# Patient Record
Sex: MALE | Race: WHITE | Employment: FULL TIME | URBAN - NONMETROPOLITAN AREA
[De-identification: names, ages, dates, MRNs, and addresses within clinical notes are randomized per-mention and may not be internally consistent; named-entity substitution may affect disease eponyms.]

---

## 2017-12-31 ENCOUNTER — HOSPITAL ENCOUNTER (EMERGENCY)
Age: 42
Discharge: HOME | End: 2017-12-31
Attending: FAMILY MEDICINE
Payer: COMMERCIAL

## 2017-12-31 VITALS
SYSTOLIC BLOOD PRESSURE: 118 MMHG | RESPIRATION RATE: 18 BRPM | TEMPERATURE: 98.8 F | DIASTOLIC BLOOD PRESSURE: 75 MMHG | HEART RATE: 87 BPM | OXYGEN SATURATION: 99 %

## 2017-12-31 DIAGNOSIS — J11.1 INFLUENZA WITH RESPIRATORY MANIFESTATION OTHER THAN PNEUMONIA: Primary | ICD-10-CM

## 2017-12-31 DIAGNOSIS — G62.9 NEUROPATHY: ICD-10-CM

## 2017-12-31 DIAGNOSIS — E87.1 HYPONATREMIA: ICD-10-CM

## 2017-12-31 LAB
ABSOLUTE EOS #: 0 K/UL (ref 0–0.4)
ABSOLUTE IMMATURE GRANULOCYTE: ABNORMAL K/UL (ref 0–0.3)
ABSOLUTE LYMPH #: 1.1 K/UL (ref 1–4.8)
ABSOLUTE MONO #: 0.4 K/UL (ref 0–1)
ANION GAP SERPL CALCULATED.3IONS-SCNC: 11 MMOL/L (ref 9–17)
BASOPHILS # BLD: 0 % (ref 0–2)
BASOPHILS ABSOLUTE: 0 K/UL (ref 0–0.2)
BUN BLDV-MCNC: 15 MG/DL (ref 6–20)
BUN/CREAT BLD: 18 (ref 9–20)
CALCIUM SERPL-MCNC: 9.3 MG/DL (ref 8.6–10.4)
CHLORIDE BLD-SCNC: 91 MMOL/L (ref 98–107)
CO2: 29 MMOL/L (ref 20–31)
CREAT SERPL-MCNC: 0.83 MG/DL (ref 0.7–1.2)
DIFFERENTIAL TYPE: YES
DIRECT EXAM: ABNORMAL
DIRECT EXAM: NORMAL
EOSINOPHILS RELATIVE PERCENT: 0 % (ref 0–5)
GFR AFRICAN AMERICAN: >60 ML/MIN
GFR NON-AFRICAN AMERICAN: >60 ML/MIN
GFR SERPL CREATININE-BSD FRML MDRD: ABNORMAL ML/MIN/{1.73_M2}
GFR SERPL CREATININE-BSD FRML MDRD: ABNORMAL ML/MIN/{1.73_M2}
GLUCOSE BLD-MCNC: 106 MG/DL (ref 70–99)
HCT VFR BLD CALC: 39 % (ref 41–53)
HEMOGLOBIN: 13.2 G/DL (ref 13.5–17.5)
IMMATURE GRANULOCYTES: ABNORMAL %
LYMPHOCYTES # BLD: 15 % (ref 13–44)
Lab: ABNORMAL
Lab: NORMAL
MCH RBC QN AUTO: 28.8 PG (ref 26–34)
MCHC RBC AUTO-ENTMCNC: 33.8 G/DL (ref 31–37)
MCV RBC AUTO: 85.4 FL (ref 80–100)
MONOCYTES # BLD: 5 % (ref 5–9)
PDW BLD-RTO: 13.5 % (ref 12.1–15.2)
PLATELET # BLD: 175 K/UL (ref 140–450)
PLATELET ESTIMATE: ABNORMAL
PMV BLD AUTO: ABNORMAL FL (ref 6–12)
POTASSIUM SERPL-SCNC: 3.8 MMOL/L (ref 3.7–5.3)
RBC # BLD: 4.56 M/UL (ref 4.5–5.9)
RBC # BLD: ABNORMAL 10*6/UL
SEG NEUTROPHILS: 80 % (ref 39–75)
SEGMENTED NEUTROPHILS ABSOLUTE COUNT: 5.9 K/UL (ref 2.1–6.5)
SODIUM BLD-SCNC: 131 MMOL/L (ref 135–144)
SPECIMEN DESCRIPTION: ABNORMAL
SPECIMEN DESCRIPTION: NORMAL
STATUS: ABNORMAL
STATUS: NORMAL
VITAMIN B-12: 358 PG/ML (ref 211–946)
WBC # BLD: 7.5 K/UL (ref 3.5–11)
WBC # BLD: ABNORMAL 10*3/UL

## 2017-12-31 PROCEDURE — 86063 ANTISTREPTOLYSIN O SCREEN: CPT

## 2017-12-31 PROCEDURE — 2580000003 HC RX 258: Performed by: FAMILY MEDICINE

## 2017-12-31 PROCEDURE — 36415 COLL VENOUS BLD VENIPUNCTURE: CPT

## 2017-12-31 PROCEDURE — 87804 INFLUENZA ASSAY W/OPTIC: CPT

## 2017-12-31 PROCEDURE — 6360000002 HC RX W HCPCS: Performed by: FAMILY MEDICINE

## 2017-12-31 PROCEDURE — 80048 BASIC METABOLIC PNL TOTAL CA: CPT

## 2017-12-31 PROCEDURE — 99283 EMERGENCY DEPT VISIT LOW MDM: CPT

## 2017-12-31 PROCEDURE — 82607 VITAMIN B-12: CPT

## 2017-12-31 PROCEDURE — 85025 COMPLETE CBC W/AUTO DIFF WBC: CPT

## 2017-12-31 PROCEDURE — 87651 STREP A DNA AMP PROBE: CPT

## 2017-12-31 PROCEDURE — 6370000000 HC RX 637 (ALT 250 FOR IP): Performed by: FAMILY MEDICINE

## 2017-12-31 RX ORDER — ONDANSETRON 4 MG/1
4 TABLET, ORALLY DISINTEGRATING ORAL ONCE
Status: COMPLETED | OUTPATIENT
Start: 2017-12-31 | End: 2017-12-31

## 2017-12-31 RX ORDER — LANOLIN ALCOHOL/MO/W.PET/CERES
1000 CREAM (GRAM) TOPICAL DAILY
Qty: 30 TABLET | Refills: 3 | Status: SHIPPED | OUTPATIENT
Start: 2017-12-31 | End: 2020-09-05 | Stop reason: ALTCHOICE

## 2017-12-31 RX ORDER — SODIUM CHLORIDE 9 MG/ML
INJECTION, SOLUTION INTRAVENOUS CONTINUOUS
Status: DISCONTINUED | OUTPATIENT
Start: 2017-12-31 | End: 2017-12-31 | Stop reason: HOSPADM

## 2017-12-31 RX ORDER — GABAPENTIN 100 MG/1
300 CAPSULE ORAL 2 TIMES DAILY
COMMUNITY
End: 2020-09-05 | Stop reason: ALTCHOICE

## 2017-12-31 RX ORDER — ONDANSETRON 4 MG/1
4 TABLET, ORALLY DISINTEGRATING ORAL EVERY 4 HOURS PRN
Qty: 40 TABLET | Refills: 0 | Status: SHIPPED | OUTPATIENT
Start: 2017-12-31 | End: 2018-01-10

## 2017-12-31 RX ORDER — OSELTAMIVIR PHOSPHATE 75 MG/1
75 CAPSULE ORAL 2 TIMES DAILY
Qty: 20 CAPSULE | Refills: 0 | Status: SHIPPED | OUTPATIENT
Start: 2017-12-31 | End: 2018-01-10

## 2017-12-31 RX ORDER — OSELTAMIVIR PHOSPHATE 75 MG/1
75 CAPSULE ORAL ONCE
Status: COMPLETED | OUTPATIENT
Start: 2017-12-31 | End: 2017-12-31

## 2017-12-31 RX ORDER — BUPROPION HYDROCHLORIDE 150 MG/1
150 TABLET ORAL DAILY
COMMUNITY
End: 2020-09-05 | Stop reason: ALTCHOICE

## 2017-12-31 RX ADMIN — ONDANSETRON 4 MG: 4 TABLET, ORALLY DISINTEGRATING ORAL at 14:28

## 2017-12-31 RX ADMIN — OSELTAMIVIR PHOSPHATE 75 MG: 75 CAPSULE ORAL at 14:28

## 2017-12-31 RX ADMIN — SODIUM CHLORIDE: 9 INJECTION, SOLUTION INTRAVENOUS at 15:29

## 2017-12-31 ASSESSMENT — PAIN SCALES - GENERAL
PAINLEVEL_OUTOF10: 6
PAINLEVEL_OUTOF10: 7

## 2017-12-31 ASSESSMENT — PAIN DESCRIPTION - PAIN TYPE: TYPE: ACUTE PAIN

## 2017-12-31 ASSESSMENT — PAIN DESCRIPTION - FREQUENCY: FREQUENCY: INTERMITTENT

## 2017-12-31 ASSESSMENT — PAIN DESCRIPTION - DESCRIPTORS: DESCRIPTORS: ACHING

## 2017-12-31 ASSESSMENT — PAIN DESCRIPTION - LOCATION: LOCATION: OTHER (COMMENT)

## 2017-12-31 NOTE — ED PROVIDER NOTES
eMERGENCY dEPARTMENT eNCOUnter        [facility]    Chief Complaint   Patient presents with    Fever    Nausea    Pharyngitis    Cough    Generalized Body Aches       HPI    Bianca Kelly is a 40 y.o. male who presents to ED from Home by private vehicle with spouse he is complaining of being sick for about a week and worse for the last 2 days. He is not eating very much but he is drinking lots of Gatorade and his last urination this morning was not extremely concentrated. His pain level is a 7 aching all over. REVIEW OF SYSTEMS    All systems reviewed and positives are in the HPI      PAST MEDICAL HISTORY    Past Medical History:   Diagnosis Date    Depression     Neuropathy ([facility] [address]        SURGICAL HISTORY    Past Surgical History:   Procedure Laterality Date    VASECTOMY REVERSAL         CURRENT MEDICATIONS    Current Outpatient Rx   Medication Sig Dispense Refill    buPROPion (WELLBUTRIN XL) 150 MG extended release tablet Take 150 mg by mouth daily      gabapentin (NEURONTIN) 100 MG capsule Take 300 mg by mouth 2 times daily.       ondansetron (ZOFRAN ODT) 4 MG disintegrating tablet Take 1 tablet by mouth every 4 hours as needed for Nausea or Vomiting 40 tablet 0    vitamin B-12 (CYANOCOBALAMIN) 1000 MCG tablet Take 1 tablet by mouth daily 30 tablet 3    oseltamivir (TAMIFLU) 75 MG capsule Take 1 capsule by mouth 2 times daily for 10 days 20 capsule 0    aspirin 81 MG tablet Take 81 mg by mouth daily         ALLERGIES    No Known Allergies    FAMILY HISTORY    Family History   Problem Relation Age of Onset    Diabetes Mother     Cancer Father        SOCIAL HISTORY    Social History     Social History    Marital status:      Spouse name: N/A    Number of children: N/A    Years of education: N/A     Social History Main Topics    Smoking status: Never Smoker    Smokeless tobacco: Never Used    Alcohol use No    Drug use: No    Sexual activity: Not Asked     Other Topics Concern

## 2018-01-01 LAB
ANTISTREPTOLYSIN-O: 31 IU/ML (ref 0–200)
DIRECT EXAM: NORMAL
DIRECT EXAM: NORMAL
Lab: NORMAL
SPECIMEN DESCRIPTION: NORMAL
SPECIMEN DESCRIPTION: NORMAL
STATUS: NORMAL

## 2018-02-10 ENCOUNTER — APPOINTMENT (OUTPATIENT)
Dept: GENERAL RADIOLOGY | Age: 43
End: 2018-02-10
Payer: COMMERCIAL

## 2018-02-10 ENCOUNTER — HOSPITAL ENCOUNTER (EMERGENCY)
Age: 43
Discharge: HOME | End: 2018-02-10
Attending: FAMILY MEDICINE
Payer: COMMERCIAL

## 2018-02-10 VITALS
BODY MASS INDEX: 20.67 KG/M2 | RESPIRATION RATE: 16 BRPM | TEMPERATURE: 99.1 F | SYSTOLIC BLOOD PRESSURE: 110 MMHG | HEART RATE: 92 BPM | WEIGHT: 140 LBS | OXYGEN SATURATION: 100 % | DIASTOLIC BLOOD PRESSURE: 68 MMHG

## 2018-02-10 DIAGNOSIS — S63.501A SPRAIN OF RIGHT WRIST, INITIAL ENCOUNTER: Primary | ICD-10-CM

## 2018-02-10 PROCEDURE — 99283 EMERGENCY DEPT VISIT LOW MDM: CPT

## 2018-02-10 PROCEDURE — 73110 X-RAY EXAM OF WRIST: CPT

## 2018-02-10 RX ORDER — NAPROXEN SODIUM 220 MG
440 TABLET ORAL 2 TIMES DAILY WITH MEALS
COMMUNITY
End: 2020-09-05 | Stop reason: ALTCHOICE

## 2018-02-10 ASSESSMENT — PAIN DESCRIPTION - ORIENTATION: ORIENTATION: RIGHT

## 2018-02-10 ASSESSMENT — PAIN DESCRIPTION - LOCATION: LOCATION: WRIST

## 2018-02-10 ASSESSMENT — PAIN DESCRIPTION - DESCRIPTORS: DESCRIPTORS: ACHING

## 2018-02-10 ASSESSMENT — PAIN SCALES - GENERAL
PAINLEVEL_OUTOF10: 6
PAINLEVEL_OUTOF10: 0
PAINLEVEL_OUTOF10: 3

## 2018-02-10 ASSESSMENT — PAIN DESCRIPTION - PAIN TYPE: TYPE: ACUTE PAIN

## 2018-02-10 ASSESSMENT — PAIN DESCRIPTION - FREQUENCY: FREQUENCY: CONTINUOUS

## 2018-02-11 NOTE — ED PROVIDER NOTES
appearance   HENT:  Atraumatic, external ears normal, nose normal, oropharynx moist.  Neck- normal range of motion, no tenderness, supple   Respiratory:  No respiratory distress, normal breath sounds. Cardiovascular:  Normal rate, normal rhythm, no murmurs, no gallops, no rubs   GI:  Soft, nondistended, normal bowel sounds, nontender   Musculoskeletal:  Right wrist tenderness. Moderate swelling present. Decreased range of motion due to pain. Integument:  Well hydrated, no rash   Neurologic:  Grossly intact    RADIOLOGY/PROCEDURES    X-rays of right wrist were negative for fracture. ED COURSE & MEDICAL DECISION MAKING    Pertinent Labs & Imaging studies reviewed. (See chart for details)  Splint was applied to right wrist.  Patient was stable on discharge. FINAL IMPRESSION    1. Right wrist sprain. 2.      Summation      Patient Course: X-ray of right wrist was normal.  Splint was applied to right wrist.  Patient was stable on discharge. ED Medications administered this visit:  Medications - No data to display    New Prescriptions from this visit:    Discharge Medication List as of 2/10/2018  5:24 PM          Follow-up:  Madison Warren MD  [address] [place] [address] Dr. Greenberg [facility] 0  [phone]    Call in 2 days          Final Impression:   1.  Sprain of right wrist, initial encounter               (Please note that portions of this note were completed with a voice recognition program.  Efforts were made to edit the dictations but occasionally words are mis-transcribed.)       Pat Farley MD  02/11/18 0863

## 2020-09-05 ENCOUNTER — APPOINTMENT (OUTPATIENT)
Dept: CT IMAGING | Age: 45
End: 2020-09-05
Payer: COMMERCIAL

## 2020-09-05 ENCOUNTER — HOSPITAL ENCOUNTER (EMERGENCY)
Age: 45
Discharge: HOME | End: 2020-09-05
Attending: EMERGENCY MEDICINE
Payer: COMMERCIAL

## 2020-09-05 VITALS
DIASTOLIC BLOOD PRESSURE: 81 MMHG | HEIGHT: 69 IN | TEMPERATURE: 98.8 F | HEART RATE: 84 BPM | RESPIRATION RATE: 20 BRPM | OXYGEN SATURATION: 98 % | SYSTOLIC BLOOD PRESSURE: 130 MMHG | WEIGHT: 166 LBS | BODY MASS INDEX: 24.59 KG/M2

## 2020-09-05 LAB
ANION GAP SERPL CALCULATED.3IONS-SCNC: 11 MMOL/L (ref 9–17)
BUN BLDV-MCNC: 9 MG/DL (ref 6–20)
BUN/CREAT BLD: 9 (ref 9–20)
CALCIUM SERPL-MCNC: 9.7 MG/DL (ref 8.6–10.4)
CHLORIDE BLD-SCNC: 102 MMOL/L (ref 98–107)
CO2: 26 MMOL/L (ref 20–31)
CREAT SERPL-MCNC: 1.05 MG/DL (ref 0.7–1.2)
GFR AFRICAN AMERICAN: >60 ML/MIN
GFR NON-AFRICAN AMERICAN: >60 ML/MIN
GFR SERPL CREATININE-BSD FRML MDRD: ABNORMAL ML/MIN/{1.73_M2}
GFR SERPL CREATININE-BSD FRML MDRD: ABNORMAL ML/MIN/{1.73_M2}
GLUCOSE BLD-MCNC: 107 MG/DL (ref 70–99)
GLUCOSE BLD-MCNC: 108 MG/DL (ref 65–99)
HCT VFR BLD CALC: 42 % (ref 41–53)
HEMOGLOBIN: 14.1 G/DL (ref 13.5–17.5)
MCH RBC QN AUTO: 28.5 PG (ref 26–34)
MCHC RBC AUTO-ENTMCNC: 33.5 G/DL (ref 31–37)
MCV RBC AUTO: 85 FL (ref 80–100)
NRBC AUTOMATED: NORMAL PER 100 WBC
PDW BLD-RTO: 13.4 % (ref 12.1–15.2)
PLATELET # BLD: 331 K/UL (ref 140–450)
PMV BLD AUTO: NORMAL FL (ref 6–12)
POTASSIUM SERPL-SCNC: 3.6 MMOL/L (ref 3.7–5.3)
RBC # BLD: 4.94 M/UL (ref 4.5–5.9)
SODIUM BLD-SCNC: 139 MMOL/L (ref 135–144)
TROPONIN INTERP: NORMAL
TROPONIN T: <0.03 NG/ML
TROPONIN, HIGH SENSITIVITY: NORMAL NG/L (ref 0–22)
WBC # BLD: 6 K/UL (ref 3.5–11)

## 2020-09-05 PROCEDURE — 93005 ELECTROCARDIOGRAM TRACING: CPT | Performed by: EMERGENCY MEDICINE

## 2020-09-05 PROCEDURE — 85027 COMPLETE CBC AUTOMATED: CPT

## 2020-09-05 PROCEDURE — 82947 ASSAY GLUCOSE BLOOD QUANT: CPT

## 2020-09-05 PROCEDURE — 99284 EMERGENCY DEPT VISIT MOD MDM: CPT

## 2020-09-05 PROCEDURE — 36415 COLL VENOUS BLD VENIPUNCTURE: CPT

## 2020-09-05 PROCEDURE — 84484 ASSAY OF TROPONIN QUANT: CPT

## 2020-09-05 PROCEDURE — 6370000000 HC RX 637 (ALT 250 FOR IP): Performed by: EMERGENCY MEDICINE

## 2020-09-05 PROCEDURE — 70496 CT ANGIOGRAPHY HEAD: CPT

## 2020-09-05 PROCEDURE — 6360000004 HC RX CONTRAST MEDICATION: Performed by: EMERGENCY MEDICINE

## 2020-09-05 PROCEDURE — 70450 CT HEAD/BRAIN W/O DYE: CPT

## 2020-09-05 PROCEDURE — 80048 BASIC METABOLIC PNL TOTAL CA: CPT

## 2020-09-05 RX ORDER — CLOPIDOGREL BISULFATE 75 MG/1
75 TABLET ORAL DAILY
Qty: 30 TABLET | Refills: 0 | Status: SHIPPED | OUTPATIENT
Start: 2020-09-05

## 2020-09-05 RX ORDER — CLOPIDOGREL BISULFATE 75 MG/1
300 TABLET ORAL ONCE
Status: COMPLETED | OUTPATIENT
Start: 2020-09-05 | End: 2020-09-05

## 2020-09-05 RX ORDER — TETRACAINE HYDROCHLORIDE 5 MG/ML
1 SOLUTION OPHTHALMIC ONCE
Status: COMPLETED | OUTPATIENT
Start: 2020-09-05 | End: 2020-09-05

## 2020-09-05 RX ADMIN — IOPAMIDOL 100 ML: 755 INJECTION, SOLUTION INTRAVENOUS at 09:43

## 2020-09-05 RX ADMIN — TETRACAINE HYDROCHLORIDE 1 DROP: 5 SOLUTION OPHTHALMIC at 08:20

## 2020-09-05 RX ADMIN — CLOPIDOGREL BISULFATE 300 MG: 75 TABLET ORAL at 09:47

## 2020-09-05 RX ADMIN — FLUORESCEIN SODIUM 1 MG: 1 STRIP OPHTHALMIC at 08:21

## 2020-09-05 ASSESSMENT — ENCOUNTER SYMPTOMS
BACK PAIN: 0
TROUBLE SWALLOWING: 0
CHEST TIGHTNESS: 0
ABDOMINAL PAIN: 0
DIARRHEA: 0
SHORTNESS OF BREATH: 0
SORE THROAT: 0
EYE PAIN: 0
RHINORRHEA: 0
VOMITING: 0
WHEEZING: 0

## 2020-09-05 ASSESSMENT — VISUAL ACUITY
OS: 20/20
OD: 20/20
OU: 20/20

## 2020-09-05 ASSESSMENT — PAIN DESCRIPTION - DESCRIPTORS: DESCRIPTORS: STABBING;SHARP

## 2020-09-05 ASSESSMENT — PAIN SCALES - GENERAL: PAINLEVEL_OUTOF10: 0

## 2020-09-05 ASSESSMENT — PAIN DESCRIPTION - ORIENTATION: ORIENTATION: LEFT

## 2020-09-05 ASSESSMENT — PAIN DESCRIPTION - LOCATION: LOCATION: EYE

## 2020-09-05 NOTE — ED PROVIDER NOTES
@@  eMERGENCY dEPARTMENT eNCOUnter      Pt Name: Gunnar Vyas  MRN: [id number]  [place] 1975  Date of evaluation: 9/5/2020  Provider: Tj Carrillo MD    CHIEF COMPLAINT       Chief Complaint   Patient presents with    Eye Pain     Pt has had left eye pain for past couple days and woke up this morning with blurry vision in that eye. pt has hx retinal occulsion about 5-6 years ago. HISTORYOF PRESENT ILLNESS   (Location/Symptom, Timing/Onset, Context/Setting, Quality, Duration, ModifyingFactors, Severity) Note limiting factors. HPI    Gunnar Vyas is a 41 y.o. male who presents to the emergency department with acute loss of vision in the left eye. He states he was at work and at 7:15 AM, he lost most of his vision of his left eye, felt like there was just a big area of his vision that he could not see. It persisted on the drive over here, he states there would be whole cars that just disappeared when they went in that portion of his vision. Around 8:15 AM, as he was arriving to the ED, it resolved. He has a history of a central retinal artery occlusion of the left about 7 years ago. He was seen at [facility] at that time and started on baby aspirin. He took the baby aspirin for about 5 years but has not taken it over the last 2 years. When this occurred this morning, he did take 2 full aspirin prior to coming to the ED. He states his symptoms are now resolved. His past medical history includes the central retinal artery occlusion, also a \"virus\" this caused intermittent weakness of his right arm but is unrelated to the artery occlusion. He does not currently see a neurologist.    Nursing Notes were reviewed. REVIEW OF SYSTEMS    (2+ for level 4; 10+ for level 5)   Review of Systems   Constitutional: Negative for diaphoresis and fever. HENT: Negative for rhinorrhea, sore throat and trouble swallowing. Eyes: Positive for visual disturbance. Negative for pain. Respiratory: Negative for chest tightness, shortness of breath and wheezing. Cardiovascular: Negative for chest pain and leg swelling. Gastrointestinal: Negative for abdominal pain, diarrhea and vomiting. Endocrine: Negative for polyuria. Genitourinary: Negative for dysuria and frequency. Musculoskeletal: Negative for back pain, myalgias, neck pain and neck stiffness. Skin: Negative for rash. Allergic/Immunologic: Negative for immunocompromised state. Neurological: Negative for dizziness, seizures, syncope and headaches. Hematological: Does not bruise/bleed easily. Psychiatric/Behavioral: Negative for confusion. All other systems reviewed and are negative. PAST MEDICAL HISTORY     Past Medical History:   Diagnosis Date    Depression     Neuropathy        SURGICAL HISTORY       Past Surgical History:   Procedure Laterality Date    VASECTOMY REVERSAL         CURRENT MEDICATIONS     [unfilled]    ALLERGIES     Patient has no known allergies.     FAMILY HISTORY       Family History   Problem Relation Age of Onset    Diabetes Mother     Cancer Father         SOCIAL HISTORY       Social History     Socioeconomic History    Marital status:      Spouse name: None    Number of children: None    Years of education: None    Highest education level: None   Occupational History    None   Social Needs    Financial resource strain: None    Food insecurity     Worry: None     Inability: None    Transportation needs     Medical: None     Non-medical: None   Tobacco Use    Smoking status: Never Smoker    Smokeless tobacco: Never Used   Substance and Sexual Activity    Alcohol use: No    Drug use: No    Sexual activity: None   Lifestyle    Physical activity     Days per week: None     Minutes per session: None    Stress: None   Relationships    Social connections     Talks on phone: None     Gets together: None     Attends Yarsani service: None     Active member of club or organization: None     Attends meetings of clubs or organizations: None     Relationship status: None    Intimate partner violence     Fear of current or ex partner: None     Emotionally abused: None     Physically abused: None     Forced sexual activity: None   Other Topics Concern    None   Social History Narrative    None       SCREENINGS   NIH Stroke Scale  NIH Stroke Scale Assessed: Yes  Interval: Baseline  Level of Consciousness (1a. ): Alert  LOC Questions (1b. ): Answers both correctly  LOC Commands (1c. ): Performs both tasks correctly  Best Gaze (2. ): Normal  Visual (3. ): No visual loss  Facial Palsy (4. ): Normal symmetrical movement  Motor Arm, Left (5a. ): No drift  Motor Arm, Right (5b. ): No drift  Motor Leg, Left (6a. ): No drift  Motor Leg, Right (6b. ): No drift  Limb Ataxia (7. ): Absent  Sensory (8. ): Normal  Best Language (9. ): No aphasia  Dysarthria (10. ): Normal  Extinction and Inattention (11): No abnormality  Total: 0Glasgow Coma Scale  Eye Opening: Spontaneous  Best Verbal Response: Oriented  Best Motor Response: Obeys commands  [place] Coma Scale Score: 15      PHYSICAL EXAM    (up to 7 forlevel 4, 8 or more for level 5)     ED Triage Vitals [09/05/20 0809]   BP Temp Temp Source Pulse Resp SpO2 Height Weight   134/85 98.8 °F (37.1 °C) Oral 94 18 99 % 5' 9\" (1.753 m) 166 lb (75.3 kg)       Physical Exam  Vitals signs and nursing note reviewed. Constitutional:       General: He is not in acute distress. Appearance: He is well-developed. HENT:      Head: Normocephalic and atraumatic. Eyes:      General:         Right eye: No discharge. Left eye: No discharge. Extraocular Movements: Extraocular movements intact. Pupils: Pupils are equal, round, and reactive to light. Comments: No fluorescein uptake, no corneal abrasion. Visual fields intact. Visual acuity 20/20, OD, OS, OU. Neck:      Musculoskeletal: Normal range of motion and neck supple. aneurysm or dissection within the neck MRI would be more sensitive for detection of an acute infarct. ED BEDSIDE ULTRASOUND:   Performed by ED Physician - none    LABS:  Labs Reviewed   GLUCOSE, WHOLE BLOOD - Abnormal; Notable for the following components:       Result Value    POC Glucose 108 (*)     All other components within normal limits   BASIC METABOLIC PANEL - Abnormal; Notable for the following components:    Glucose 107 (*)     Potassium 3.6 (*)     All other components within normal limits   CBC   TROPONIN        All other labs were within normal range or not returned as of this dictation. EMERGENCY DEPARTMENT COURSE and DIFFERENTIALDIAGNOSIS/MDM:   Vitals:    Vitals:    09/05/20 0809 09/05/20 0843 09/05/20 0948   BP: 134/85 (!) 128/95 130/81   Pulse: 94 87 84   Resp: 18 20 20   Temp: 98.8 °F (37.1 °C)     TempSrc: Oral     SpO2: 99% 97% 98%   Weight: 166 lb (75.3 kg)     Height: 5' 9\" (1.753 m)         Medications   tetracaine (TETRAVISC) 0.5 % ophthalmic solution 1 drop (1 drop Left Eye Given 9/5/20 0820)   fluorescein ophthalmic strip 1 mg (1 mg Ophthalmic Given 9/5/20 0821)   clopidogrel (PLAVIX) tablet 300 mg (300 mg Oral Given 9/5/20 0947)   iopamidol (ISOVUE-370) 76 % injection 100 mL (100 mLs Intravenous Given 9/5/20 0943)       MDM. Discussed with Dr. Mi Wasserman, neurology at [facility]. Recommended plan is to check CTA head and neck. Provided that is negative we will load him with 300 mg of Plavix and then start him on 75 mg daily. Load him with 325 mg of aspirin which he already took prior to arrival and then continue him on 81 mg of aspirin daily. He will follow-up with Dr. Mi Wasserman in the office and return if any recurrence or change of symptoms. REVAL:         CRITICAL CARE TIME   Total Critical Care time was 0 minutes, excluding separatelyreportable procedures.   There was a high probability ofclinically significant/life threatening deterioration in the patient's condition which required my urgent intervention. CONSULTS:  [unfilled]    PROCEDURES:  Unless otherwise noted below, none     Procedures    FINAL IMPRESSION      1. Central retinal artery occlusion of left eye          DISPOSITION/PLAN   DISPOSITION        PATIENT REFERRED TO:  Dr Luis Carlos Rae  [phone]  Call for an appointment after the holiday weekend        Jamil Vasquez MD  [address] [place] [address], [address]  MOB # [address] [facility]  [phone]    In 2 days        DISCHARGE MEDICATIONS:  New Prescriptions    CLOPIDOGREL (PLAVIX) 75 MG TABLET    Take 1 tablet by mouth daily              Summation      Patient Course: Left central retinal artery occlusion, resolved, negative CT CTA. Neuro consulted, Started on aspirin and Plavix with close neuro follow-up. Plavix precautions given. Will return if any recurrence of symptoms. ED Medications administered this visit:    Medications   tetracaine (TETRAVISC) 0.5 % ophthalmic solution 1 drop (1 drop Left Eye Given 9/5/20 0820)   fluorescein ophthalmic strip 1 mg (1 mg Ophthalmic Given 9/5/20 0821)   clopidogrel (PLAVIX) tablet 300 mg (300 mg Oral Given 9/5/20 0947)   iopamidol (ISOVUE-370) 76 % injection 100 mL (100 mLs Intravenous Given 9/5/20 0943)       New Prescriptions from this visit:    New Prescriptions    CLOPIDOGREL (PLAVIX) 75 MG TABLET    Take 1 tablet by mouth daily       Follow-up:  Dr Luis Carlos Rae  [phone]  Call for an appointment after the holiday weekend        Jamil Vasquez MD  [address] [place] [address], [address]  MOB # [id number] [facility] [place] [place]  [phone]    In 2 days          Final Impression:  1. Central retinal artery occlusion of left eye                   (Please note:  Portions of this note were completed with a voicerecognition program.  Efforts were made to edit the dictations but occasionally words and phrases are mis-transcribed.)  Form v2016. J.5-cn    Marilyn Walker MD (electronically signed)  Emergency Medicine Provider            Marilyn Walker, MD  09/05/20 1056

## 2020-09-07 LAB
EKG ATRIAL RATE: 78 BPM
EKG P AXIS: 44 DEGREES
EKG P-R INTERVAL: 150 MS
EKG Q-T INTERVAL: 376 MS
EKG QRS DURATION: 82 MS
EKG QTC CALCULATION (BAZETT): 428 MS
EKG R AXIS: -7 DEGREES
EKG T AXIS: 35 DEGREES
EKG VENTRICULAR RATE: 78 BPM

## 2020-09-07 PROCEDURE — 93010 ELECTROCARDIOGRAM REPORT: CPT | Performed by: INTERNAL MEDICINE

## 2023-03-15 ENCOUNTER — APPOINTMENT (OUTPATIENT)
Dept: GENERAL RADIOLOGY | Age: 48
End: 2023-03-15
Payer: COMMERCIAL

## 2023-03-15 ENCOUNTER — HOSPITAL ENCOUNTER (EMERGENCY)
Age: 48
Discharge: HOME | End: 2023-03-15
Attending: EMERGENCY MEDICINE
Payer: COMMERCIAL

## 2023-03-15 VITALS
SYSTOLIC BLOOD PRESSURE: 99 MMHG | HEIGHT: 69 IN | BODY MASS INDEX: 24.91 KG/M2 | RESPIRATION RATE: 18 BRPM | DIASTOLIC BLOOD PRESSURE: 85 MMHG | TEMPERATURE: 98.2 F | HEART RATE: 103 BPM | WEIGHT: 168.2 LBS | OXYGEN SATURATION: 94 %

## 2023-03-15 DIAGNOSIS — B34.9 VIRAL SYNDROME: Primary | ICD-10-CM

## 2023-03-15 LAB
ABSOLUTE EOS #: 0.3 K/UL (ref 0–0.4)
ABSOLUTE LYMPH #: 1.8 K/UL (ref 1–4.8)
ABSOLUTE MONO #: 0.7 K/UL (ref 0–1)
ALBUMIN SERPL-MCNC: 4.2 G/DL (ref 3.5–5.2)
ALP SERPL-CCNC: 147 U/L (ref 40–129)
ALT SERPL-CCNC: 24 U/L (ref 5–41)
ANION GAP SERPL CALCULATED.3IONS-SCNC: 8 MMOL/L (ref 9–17)
AST SERPL-CCNC: 25 U/L
BASOPHILS # BLD: 1 % (ref 0–2)
BASOPHILS ABSOLUTE: 0 K/UL (ref 0–0.2)
BILIRUB SERPL-MCNC: 0.6 MG/DL (ref 0.3–1.2)
BUN SERPL-MCNC: 7 MG/DL (ref 6–20)
BUN/CREAT BLD: 7 (ref 9–20)
CALCIUM SERPL-MCNC: 9.4 MG/DL (ref 8.6–10.4)
CHLORIDE SERPL-SCNC: 97 MMOL/L (ref 98–107)
CO2 SERPL-SCNC: 29 MMOL/L (ref 20–31)
CREAT SERPL-MCNC: 0.98 MG/DL (ref 0.7–1.2)
DIFFERENTIAL TYPE: YES
EOSINOPHILS RELATIVE PERCENT: 3 % (ref 0–5)
FLUAV AG SPEC QL: NEGATIVE
FLUBV AG SPEC QL: NEGATIVE
GFR SERPL CREATININE-BSD FRML MDRD: >60 ML/MIN/1.73M2
GLUCOSE SERPL-MCNC: 92 MG/DL (ref 70–99)
HCT VFR BLD AUTO: 40.2 % (ref 41–53)
HGB BLD-MCNC: 13.5 G/DL (ref 13.5–17.5)
LYMPHOCYTES # BLD: 18 % (ref 13–44)
MCH RBC QN AUTO: 28.9 PG (ref 26–34)
MCHC RBC AUTO-ENTMCNC: 33.5 G/DL (ref 31–37)
MCV RBC AUTO: 86.4 FL (ref 80–100)
MONOCYTES # BLD: 7 % (ref 5–9)
PDW BLD-RTO: 13.3 % (ref 12.1–15.2)
PLATELET # BLD AUTO: 472 K/UL (ref 140–450)
POTASSIUM SERPL-SCNC: 3.7 MMOL/L (ref 3.7–5.3)
PROT SERPL-MCNC: 8 G/DL (ref 6.4–8.3)
RBC # BLD: 4.65 M/UL (ref 4.5–5.9)
SARS-COV-2 RDRP RESP QL NAA+PROBE: NOT DETECTED
SEG NEUTROPHILS: 71 % (ref 39–75)
SEGMENTED NEUTROPHILS ABSOLUTE COUNT: 6.9 K/UL (ref 2.1–6.5)
SODIUM SERPL-SCNC: 134 MMOL/L (ref 135–144)
SPECIMEN DESCRIPTION: NORMAL
WBC # BLD AUTO: 9.7 K/UL (ref 3.5–11)

## 2023-03-15 PROCEDURE — C9803 HOPD COVID-19 SPEC COLLECT: HCPCS

## 2023-03-15 PROCEDURE — 87635 SARS-COV-2 COVID-19 AMP PRB: CPT

## 2023-03-15 PROCEDURE — 85025 COMPLETE CBC W/AUTO DIFF WBC: CPT

## 2023-03-15 PROCEDURE — 87804 INFLUENZA ASSAY W/OPTIC: CPT

## 2023-03-15 PROCEDURE — 80053 COMPREHEN METABOLIC PANEL: CPT

## 2023-03-15 PROCEDURE — 99284 EMERGENCY DEPT VISIT MOD MDM: CPT

## 2023-03-15 PROCEDURE — 71045 X-RAY EXAM CHEST 1 VIEW: CPT

## 2023-03-15 RX ORDER — ALBUTEROL SULFATE 90 UG/1
2 AEROSOL, METERED RESPIRATORY (INHALATION) EVERY 6 HOURS PRN
COMMUNITY
Start: 2023-03-10

## 2023-03-15 ASSESSMENT — PAIN DESCRIPTION - ONSET: ONSET: ON-GOING

## 2023-03-15 ASSESSMENT — PAIN DESCRIPTION - FREQUENCY: FREQUENCY: CONTINUOUS

## 2023-03-15 ASSESSMENT — PAIN DESCRIPTION - DESCRIPTORS: DESCRIPTORS: ACHING

## 2023-03-15 ASSESSMENT — PAIN DESCRIPTION - LOCATION: LOCATION: GENERALIZED

## 2023-03-15 ASSESSMENT — PAIN SCALES - GENERAL: PAINLEVEL_OUTOF10: 4

## 2023-03-15 ASSESSMENT — PAIN - FUNCTIONAL ASSESSMENT
PAIN_FUNCTIONAL_ASSESSMENT: 0-10
PAIN_FUNCTIONAL_ASSESSMENT: ACTIVITIES ARE NOT PREVENTED

## 2023-03-15 ASSESSMENT — PAIN DESCRIPTION - PAIN TYPE: TYPE: ACUTE PAIN

## 2023-03-15 NOTE — ED PROVIDER NOTES
Antoni Hidden [address]      Pt Name: Betsy Alston  MRN: [id number]  [place] 1975  Date of evaluation: 3/15/2023  Provider: Kishor Guadalupe MD    [address] [place] [address]       Chief Complaint   Patient presents with    Illness     Pt was seen by his PCP last week, started on Mucinex and Inhaler for viral infection. He states Strep was negative. States he is achy all over, just doesn't feel good. Fevers at home last week but none this week. HISTORY OF PRESENT ILLNESS      Betsy Alston is a 56 y.o. male who presents to the emergency department patient was presented to the ER after she was negative for viral infection testing a week ago presenting with generalized weakness, cough that is already getting better with Mucinex use    He denies any chest pain nausea vomiting but he had decreased p.o. intake    Review of system otherwise negative        REVIEW OF SYSTEMS       Review of Systems   All other systems reviewed and are negative. PAST MEDICAL HISTORY     Past Medical History:   Diagnosis Date    Depression     Neuropathy          SURGICAL HISTORY       Past Surgical History:   Procedure Laterality Date    VASECTOMY REVERSAL           CURRENT MEDICATIONS       Discharge Medication List as of 3/15/2023  3:36 PM        CONTINUE these medications which have NOT CHANGED    Details   albuterol sulfate HFA (PROVENTIL;VENTOLIN;PROAIR) 108 (90 Base) MCG/ACT inhaler Inhale 2 puffs into the lungs every 6 hours as neededHistorical Med      clopidogrel (PLAVIX) 75 MG tablet Take 1 tablet by mouth daily, Disp-30 tablet,R-0Print             ALLERGIES       Patient has no known allergies.     FAMILY HISTORY       Family History   Problem Relation Age of Onset    Diabetes Mother     Cancer Father           SOCIAL HISTORY       Social History     Tobacco Use    Smoking status: Never    Smokeless tobacco: Never   Substance Use Topics    Alcohol use: No    Drug use: No         PHYSICAL EXAM ED Triage Vitals [03/15/23 1313]   BP Temp Temp Source Heart Rate Resp SpO2 Height Weight   (!) 137/91 98.2 °F (36.8 °C) Oral (!) 103 18 95 % 5' 9\" (1.753 m) 168 lb 3.2 oz (76.3 kg)       Physical Exam  VITAL SIGNS:  reviewed   HEENT: Normocephalic and atraumatic. No scleral icterus. Pupils are equal, round, and reactive to light and accommodation. No conjunctival injection is noted. Oropharynx is clear. Mouth revealed good dentition, no lesions. Tympanic membranes are clear. NECK: Supple. Trachea is midline. No evidence of thyroid enlargement. No lymphadenopathy or tenderness. CHEST: Symmetric. Nontender to palpation. LUNGS: Breath sounds are equal and clear bilaterally. No wheezes, rhonchi, or rales. HEART: Regular rate and rhythm with normal S1 and S2. No murmurs, gallops, or rubs. BREASTS: Symmetrical. No skin or nipple retractions. No nipple discharges or masses. ABDOMEN: Soft, flat, and benign. No mass, tenderness, guarding, or rebound. No organomegaly or hernia. Bowel sounds are present. No CVA tenderness or flank mass. EXTREMITIES: No cyanosis, clubbing, or edema. NEUROLOGIC: No focal sensory or motor deficits are noted. Gait is normal. Cranial nerves II through XII are intact. DIAGNOSTIC RESULTS     EKG:     RADIOLOGY:         Interpretation per the Radiologist below, if available at the time of this note:    XR CHEST PORTABLE   Final Result   FINDINGS/IMPRESSION:      1. Normal sized heart. 2. Clear lungs.                 LABS:  Labs Reviewed   CBC WITH AUTO DIFFERENTIAL - Abnormal; Notable for the following components:       Result Value    Hematocrit 40.2 (*)     Platelets 567 (*)     Segs Absolute 6.90 (*)     All other components within normal limits   COMPREHENSIVE METABOLIC PANEL - Abnormal; Notable for the following components:    Bun/Cre Ratio 7 (*)     Sodium 134 (*)     Chloride 97 (*)     Anion Gap 8 (*)     Alkaline Phosphatase 147 (*)     All other components within normal limits   COVID-19, RAPID   RAPID INFLUENZA A/B ANTIGENS       All other labs were within normal range or not returned as of this dictation. MIPS    Not applicable      Total Critical Care time was:    EMERGENCY DEPARTMENT COURSE and DIFFERENTIAL DIAGNOSIS/MDM:    Patient Course: The patient CBC and chemistry showed no acute significant pathology except for mild hyponatremia also had a chest x-ray showing no acute pathology    Explained to the patient that his symptoms of 1 week of generalized weakness could be secondary to viral infection although his flu and COVID test came negative but he just need to continue supportive care at home and follow-up with his primary care doctor    The patient to follow-up with a primary care doctor within a week for further evaluation and management and to come back to the ER in case of any new symptoms    ED Medications administered this visit:  Medications - No data to display    New Prescriptions from this visit:    Discharge Medication List as of 3/15/2023  3:36 PM          Follow-up:  Johnny Mitchell MD  [facility] [address] [place] 0  [phone]    Schedule an appointment as soon as possible for a visit in 3 days        Final Impression:   1.  Viral syndrome Stable                                                  Azam Rahman MD  03/15/23 3353

## 2023-03-23 NOTE — ED NOTES
March 23, 2023     Jesse Beattysesar, [address] [facility] [address]  [facility]    Patient: Duncan Chua   YOB: 1984   Date of Visit: 3/23/2023       Dear Dr Soha Poe: Thank you for referring Duncan Chua to me for evaluation  Below are my notes for this consultation  If you have questions, please do not hesitate to call me  I look forward to following your patient along with you  Sincerely,        Lila Mittal PA-C        CC: No Recipients  Lila Mittal PA-C  3/23/2023 11:49 AM  Sign when Signing Visit  Answers for HPI/ROS submitted by the patient on 3/23/2023  Chronicity: new  Onset: 1 to 4 weeks ago  Onset quality: sudden  Frequency: every several days  Progression since onset: gradually improving  Pain location: LLQ, epigastric region  Pain - numeric: 3/10  Pain quality: burning, cramping  Radiates to: LLQ, epigastric region  anorexia: No  arthralgias: No  belching: Yes  constipation: No  diarrhea: Yes  dysuria: No  fever: No  flatus: No  frequency: Yes  headaches: Yes  hematochezia: No  hematuria: No  melena: No  myalgias: No  nausea: Yes  weight loss: Yes  vomiting: Yes  Aggravated by: certain positions  Relieved by: activity, belching, passing flatus, recumbency  Diagnostic workup: ultrasound    [facility] - Outpatient Follow-up Note  Duncan March 46 y o  male MRN: [id number]  Encounter: [id number]          ASSESSMENT AND PLAN:      1  Decompensated hepatic cirrhosis ([address] )  2  Ascites due to alcoholic cirrhosis (HCC)  3  Esophageal varices without bleeding, unspecified esophageal varices type [facility])  -Patient was admitted at [facility] from March 3 to March 6  Initial laboratories from admission show a discriminant function of greater than 38 and a MELD score of 26  Patient had been placed on a prednisolone course and had been responding well  Will update laboratories at this time    There was no Lille score calculated at Wrist Splint applied to right wrist as ordered. Pt tolerated well.       [facility], RN  02/10/18 6111 day 7; will complete full 28-day course    -Ascites: Patient has now undergone 3 paracenteses  No evidence of SBP  Patient is on a less than 2 g salt restricted diet  Patient does report that his ascitic fluid does seem to be less than it had been at hospital discharge  No diuretics were started at the time of patient's hospital discharge due to his hyponatremia  Again laboratories will be updated at this time     -Hepatic encephalopathy: Patient is alert and oriented x3 on examination     -Varices: Patient's EGD from 3/6/2023 did show evidence of 4 columns of grade 2 esophageal varices  Patient is currently on nadolol 20 mg daily; this may need to be increased to 40 mg daily  -HCC: MRI with and without contrast showed no lesions in the liver  Patient's alpha-fetoprotein was 7 5     -Transplant: Patient has never been evaluated by transplant team   Patient will have a follow-up appointment with Dr Jean Austin on May 8     -Encouraged continued complete alcohol cessation  Patient does report that he does have a good support system and does not need any additional services for his alcohol abuse   ______________________________________________________________________    SUBJECTIVE:    36-year-old male with a past medical history significant for CVA, hypertension, hyperlipidemia, tobacco abuse, history of alcohol abuse who presented to the [place] [address] emergency department on March 3, 2023 with elevated liver enzymes  GI was consulted at that time for newly diagnosed cirrhosis secondary to alcohol  Patient was evaluated by the GI team and a full liver work-up was performed as well as an EGD  Patient's MELD score on admission was 26 and his discriminant function was 38  Patient had been placed on prednisolone and is currently still completing that prescription  There was no Lille score calculated on day 7 due to the fact the patient did not have a follow-up appointment until today    Patient reports that his last alcoholic beverage was on March 1, 2023  Patient is status post paracentesis x3  Patient's first paracentesis on 3/3/2023 removed 2300 cc of clear yellow fluid  Patient's cytology was negative for SBP  Last week with over 5 L of fluid was removed  Patient does report that he feels that his abdominal distention has improved significantly  Patient denies any severe abdominal pain  Patient does report nausea but no vomiting  Patient reports that he is trying to work on increasing his diet  He is watching his salt intake  He is trying to drink plenty of water throughout the course of the day  He reports that he is going to start exercising again  Patient does report that he has a longstanding history of alcohol abuse  He reports a decade of alcohol consumption  He reports that most recently he had been drinking 2 40s a day  Patient's EGD was performed on 3/6/2023  Patient did have 4 columns of grade 2 esophageal varices in the distal esophagus  There was evidence of moderate to severe portal hypertensive gastropathy  Patient was started on nadolol 20 mg daily  Patient's TORITO from hospital admission was negative  Patient's anti-smooth muscle antibody as well as antimitochondrial antibody again were negative  Patient's hepatitis panel was negative  Patient's alpha 1 antitrypsin was 158  Patient ceruloplasmin level was 11 2  Patients alpha-fetoprotein was normal     Patient CT abdomen pelvis from 3/3/2023 performed with IV contrast showed cirrhosis with moderate ascites  There was a small amount of colonic wall thickening  Distended gallbladder  Patient's MRI with and without contrast and MRCP from 3/5/2023 showed hepatic cirrhosis with portal hypertension  Moderate to large amount of ascites  No MR findings to indicate hepatocellular carcinoma  Patient has never been evaluated by a transplant team     REVIEW OF SYSTEMS IS OTHERWISE NEGATIVE        Historical Information   Past Medical History:   Diagnosis Date   • Hyperlipidemia    • Hypertension    • Stroke [facility])      Past Surgical History:   Procedure Laterality Date   • IR PARACENTESIS  3/3/2023   • IR PARACENTESIS  3/6/2023   • IR PARACENTESIS  3/17/2023     Social History   Social History     Substance and Sexual Activity   Alcohol Use Yes   • Alcohol/week: 6 0 standard drinks   • Types: 6 Cans of beer per week     Social History     Substance and Sexual Activity   Drug Use Never     Social History     Tobacco Use   Smoking Status Never   • Passive exposure: Never   Smokeless Tobacco Current   Tobacco Comments    Patient states he is ready for tobacco cessation     Family History   Problem Relation Age of Onset   • Lymphoma Mother    • Heart attack Mother    • Hypertension Mother    • Hypertension Father        Meds/Allergies        Current Outpatient Medications:   •  atorvastatin (LIPITOR) 40 mg tablet  •  escitalopram (LEXAPRO) 10 mg tablet  •  folic acid (FOLVITE) 1 mg tablet  •  nadolol (CORGARD) 20 mg tablet  •  prednisoLONE (ORAPRED) 15 mg/5 mL oral solution  •  thiamine 100 MG tablet  •  traZODone (DESYREL) 50 mg tablet    No Known Allergies        Objective      Blood pressure 140/84, pulse 91, height 6' 1" (1 854 m), weight 95 5 kg (210 lb 9 6 oz), SpO2 96 %  Body mass index is 27 79 kg/m²  PHYSICAL EXAM:      General Appearance:   Alert, cooperative, no distress   HEENT:   Normocephalic, atraumatic, anicteric      Neck:  Supple, symmetrical, trachea midline   Lungs:   Clear to auscultation bilaterally; no rales, rhonchi or wheezing; respirations unlabored    Heart[de-identified]   Regular rate and rhythm; no murmur, rub, or gallop     Abdomen:   Soft, non-tender, non-distended; normal bowel sounds; no masses, no organomegaly    Genitalia:   Deferred    Rectal:   Deferred    Extremities:  No cyanosis, clubbing or edema    Pulses:  2+ and symmetric    Skin:  No jaundice, rashes, or lesions    Lymph nodes:  No palpable cervical lymphadenopathy        Lab Results:   No visits with results within 1 Day(s) from this visit  Latest known visit with results is:   Appointment on 03/08/2023   Component Date Value   • WBC 03/08/2023 9 92    • RBC 03/08/2023 4 47    • Hemoglobin 03/08/2023 15 0    • Hematocrit 03/08/2023 43 3    • MCV 03/08/2023 97    • MCH 03/08/2023 33 6    • MCHC 03/08/2023 34 6    • RDW 03/08/2023 17 7 (H)    • MPV 03/08/2023 12 2    • Platelets 86/01/0239 132 (L)    • nRBC 03/08/2023 0    • Neutrophils Relative 03/08/2023 59    • Immat GRANS % 03/08/2023 0    • Lymphocytes Relative 03/08/2023 27    • Monocytes Relative 03/08/2023 14 (H)    • Eosinophils Relative 03/08/2023 0    • Basophils Relative 03/08/2023 0    • Neutrophils Absolute 03/08/2023 5 81    • Immature Grans Absolute 03/08/2023 0 03    • Lymphocytes Absolute 03/08/2023 2 65    • Monocytes Absolute 03/08/2023 1 36 (H)    • Eosinophils Absolute 03/08/2023 0 04    • Basophils Absolute 03/08/2023 0 03    • Sodium 03/08/2023 135    • Potassium 03/08/2023 3 4 (L)    • Chloride 03/08/2023 102    • CO2 03/08/2023 25    • ANION GAP 03/08/2023 8    • BUN 03/08/2023 12    • Creatinine 03/08/2023 0 74    • Glucose, Fasting 03/08/2023 76    • Calcium 03/08/2023 9 0    • Corrected Calcium 03/08/2023 10 0    • AST 03/08/2023 62 (H)    • ALT 03/08/2023 44    • Alkaline Phosphatase 03/08/2023 112 (H)    • Total Protein 03/08/2023 7 5    • Albumin 03/08/2023 2 8 (L)    • Total Bilirubin 03/08/2023 3 84 (H)    • eGFR 03/08/2023 117    • Protime 03/08/2023 19 3 (H)    • INR 03/08/2023 1 66 (H)          Radiology Results:   EGD    Result Date: 3/6/2023  Narrative: Table formatting from the original result was not included    [address] [facility] [address] [facility] [address] [phone] DATE OF SERVICE: 3/06/23 PHYSICIAN(S): Attending: Valente Estrada DO Fellow: No Staff Documented INDICATION: Alcoholism ([facility] [address] ), Cirrhosis ([facility] [address] ) POST-OP DIAGNOSIS: See the impression below  PREPROCEDURE: Informed consent was obtained for the procedure, including sedation  Risks of perforation, hemorrhage, adverse drug reaction and aspiration were discussed  The patient was placed in the left lateral decubitus position  Patient was explained about the risks and benefits of the procedure  Risks including but not limited to bleeding, infection, and perforation were explained in detail  Also explained about less than 100% sensitivity with the exam and other alternatives  PROCEDURE: EGD DETAILS OF PROCEDURE: Patient was taken to the procedure room where a time out was performed to confirm correct patient and correct procedure  The patient underwent monitored anesthesia care, which was administered by an anesthesia professional  The patient's blood pressure, heart rate, level of consciousness, respirations and oxygen were monitored throughout the procedure  The scope was advanced to the second part of the duodenum  Retroflexion was performed in the fundus  Prior to the procedure, the patient's H  Pylori status was unknown  The patient experienced no blood loss  The procedure was not difficult  The patient tolerated the procedure well  There were no apparent complications  ANESTHESIA INFORMATION: ASA: III Anesthesia Type: Anesthesia type not filed in the log  MEDICATIONS: No administrations occurring from 1413 to 1423 on 03/06/23 FINDINGS: Four or more medium and moderate grade II varices (no red demetrius sign) in the lower third of the esophagus; no bleeding was identified There is evidence of a moderate to severe portal gastropathy involving the cardia, fundus, body of the stomach  There is a moderate amount of food debris retained in the stomach consistent with gastroparesis  The duodenal bulb and 2nd part of the duodenum appeared normal  SPECIMENS: * No specimens in log *     Impression: 1   4 columns of grade 2 esophageal varices in the distal esophagus 2    Moderate to severe portal gastropathy, diffuse 3  Food retention in the stomach consistent with gastroparesis RECOMMENDATION: 1  This patient is a candidate for a nonselective beta-blocker, either nadolol 40 mg p o  daily or carvedilol, up to 12 5 mg daily 2  Resume previous diet  DO Juan José Ball [place]     IR paracentesis    Result Date: 3/17/2023  Narrative: Ultrasound-guided paracentesis Clinical History: Alcoholic hepatitis with symptomatic recurrent ascites Procedure: After explaining the risks and benefits of the procedure to the patient, informed consent was obtained  Ultrasound used to localize the right upper quadrant ascites  The overlying skin was prepped and draped in usual sterile fashion and local anesthesia was obtained with the 1% lidocaine solution  A 5 [employer] Agent needle was advanced until fluid was aspirated under live ultrasound guidance  Approximately 5950 cc' s of clear, yellow fluid was aspirated  No specimen was sent to the lab  Albumin 50 g was infused  The patient tolerated the procedure well and left the department in stable condition  Impression: Impression: Ultrasound-guided paracentesis  Signed, performed, and dictated by Vasile Oliver under the supervision of Dr Ramila Bradford  Workstation performed: [id number]     IR INPATIENT Paracentesis    Result Date: 3/6/2023  Narrative: PROCEDURE: Limited ultrasound to evaluate for ascites NUMBER OF IMAGES: Multiple INDICATION: Concern for ascites  PROCEDURE: Limited four quadrant ultrasound was performed to evaluate for ascites  FINDINGS: Limited four quadrant ultrasound failed to identify a significant intra-abdominal fluid collection suitable for aspiration  Perihepatic fluid was seen with the probe in an intercostal location  The decision to not perform a paracentesis through an intercostal approach was made as there is an increased risk for pneumothorax and pleural effusion       Impression: Limited ultrasound demonstrating absence of significant ascites suitable for aspiration  Perihepatic fluid was seen an intercostal location  No paracentesis was performed through an intercostal approach due to increased risk for pneumothorax and pleural effusion  Signed, performed, and dictated by Jeana Badillo under the supervision of Dr Devika Birmingham  Workstation performed: [id number]     IR INPATIENT Paracentesis    Result Date: 3/3/2023  Narrative: Paracentesis with ultrasound guidance 3/3/2023 History: Ascites Contrast: None Fluoroscopy time: None Number of images: 1 Radiation dose: 0 mGy Conscious sedation time: Not applicable Technique: The patient was identified verbally, and by wrist band " Time out" was performed  Informed consent was obtained  Following obtaining informed consent, the overlying skin was prepped and draped in usual sterile fashion  Lidocaine was given as local anesthesia  A 5 Western Reyna Yueh centesis needle was placed using ultrasound guidance  2300 cc of clear yellow colored fluid was removed  The patient tolerated procedure without apparent immediate complications or complaints  Dr Hermilo Ayala performed the procedure at the patient's bedside in the emergency department Findings: Free fluid is seen in the abdomen  Impression: Impression: Successful diagnostic and therapeutic paracentesis Workstation performed: [id number]     MRI abdomen w wo contrast and mrcp    Result Date: 3/5/2023  Narrative: MRI OF THE ABDOMEN WITH AND WITHOUT CONTRAST WITH MRCP INDICATION: 46 years / Male  newly diagnosed cirrhosis, hypoattenuation in the liver, rule out hepatocellular carcinoma  COMPARISON: CT performed March 3, 2023 TECHNIQUE:  Multiplanar/multisequence MRI of the abdomen with 3D MRCP was performed before and after administration of contrast  IV Contrast:  9 mL of Gadobutrol injection (SINGLE-DOSE) FINDINGS: LOWER CHEST:   Elevation of hemidiaphragms more notable on the right than the left with bibasilar atelectasis   LIVER: Nodular contours and heterogeneous enhancement consistent with cirrhotic morphology  No arterial phase hyperenhancement or diffusion restriction in the liver and most notably those findings are not seen in the most heterogeneously enhancing area in posterior dome of right hepatic lobe  Few tiny scattered intrahepatic cysts such as a 5 mm cyst in the posterior dome of right hepatic lobe on image 45 of series 12  No MR findings to indicate discrete or infiltrating hepatocellular carcinoma  Portal and hepatic veins are patent  BILE DUCTS:  No intrahepatic or extrahepatic bile duct dilation  Common bile duct is normal in caliber  No choledocholithiasis, biliary stricture or suspicious mass  GALLBLADDER:  Boehler's distended  Mild gallbladder wall thickening which is a nonspecific finding that is often seen in the setting of hepatic parenchymal disease  No gallstones  PANCREAS:  Unremarkable  ADRENAL GLANDS:  Normal  SPLEEN:  Normal  KIDNEYS/PROXIMAL URETERS:  No hydroureteronephrosis  Small renal cortical cysts  No suspicious renal mass  BOWEL:   No dilated loops of bowel  PERITONEUM/RETROPERITONEUM:  Moderate to large volume of abdominopelvic ascites reidentified  LYMPH NODES:  No abdominal lymphadenopathy  VASCULAR STRUCTURES:  Small left gastric, perisplenic, and mesenteric varices noted  ABDOMINAL WALL:  Unremarkable  OSSEOUS STRUCTURES:  No suspicious osseous lesion  Impression: Hepatic cirrhosis with portal hypertension as evidenced by moderate to large volume of ascites, and small varices  No MR findings to indicate hepatocellular carcinoma    Workstation performed: [id number]     CT abdomen pelvis with contrast    Result Date: 3/3/2023  Narrative: CT ABDOMEN AND PELVIS WITH IV CONTRAST INDICATION:   Lower abdominal pain, elevated bilirubin  COMPARISON:  None  TECHNIQUE:  CT examination of the abdomen and pelvis was performed   Axial, sagittal, and coronal 2D reformatted images were created from the source data and submitted for interpretation  Radiation dose length product (DLP) for this visit:  829 mGy-cm   This examination, like all CT scans performed in the [facility], was performed utilizing techniques to minimize radiation dose exposure, including the use of iterative reconstruction and automated exposure control  IV Contrast:  100 mL of iohexol (OMNIPAQUE) Enteric Contrast:  Enteric contrast was not administered  FINDINGS: ABDOMEN LOWER CHEST:  There is elevation of the right hemidiaphragm with overlying atelectasis  There is left basilar atelectasis  LIVER/BILIARY TREE:  The liver demonstrates cirrhotic morphology  There is heterogeneity with diffuse hypoattenuating nodularity  No biliary dilatation  Portal vein is patent  GALLBLADDER: There is gallbladder distention measuring 13 cm  No calcified gallstones  No pericholecystic inflammatory change  SPLEEN:  Unremarkable  PANCREAS:  Unremarkable  ADRENAL GLANDS:  Unremarkable  KIDNEYS/URETERS:  No hydronephrosis or urinary tract calculus  One or more sharply circumscribed subcentimeter renal hypodensities are present, too small to accurately characterize, and statistically most likely benign findings  According to recent literature (Radiology 2019) no further workup of these findings is recommended  STOMACH AND BOWEL:  There is mild wall thickening of the small bowel and colon through the transverse colon, likely related to portal hypertension  APPENDIX:  No findings to suggest appendicitis  ABDOMINOPELVIC CAVITY:  There is moderate abdominopelvic ascites  No pneumoperitoneum  No lymphadenopathy  VESSELS:  Unremarkable for patient's age  PELVIS REPRODUCTIVE ORGANS:  Unremarkable for patient's age  URINARY BLADDER:  Unremarkable  ABDOMINAL WALL/INGUINAL REGIONS:  Unremarkable  OSSEOUS STRUCTURES:  No acute fracture or destructive osseous lesion  Impression: 1  Cirrhosis with moderate ascites    The liver is diffusely heterogeneous with multiple hypoattenuating nodular foci  Correlation with AFP is recommended  If there is clinical concern, a follow-up nonemergent MRI can be obtained to evaluate for discrete lesions  2   Mild small small bowel and right/transverse colonic wall thickening, likely related to portal hypertension  3   Distended gallbladder  No stones are visualized and evaluation of the gallbladder wall is limited by surrounding ascites  If there is clinical concern for acute cholecystitis, consider follow-up ultrasound  The study was marked in [facility] for immediate notification   Workstation performed: [id number]